# Patient Record
Sex: FEMALE | Race: BLACK OR AFRICAN AMERICAN | NOT HISPANIC OR LATINO | URBAN - METROPOLITAN AREA
[De-identification: names, ages, dates, MRNs, and addresses within clinical notes are randomized per-mention and may not be internally consistent; named-entity substitution may affect disease eponyms.]

---

## 2018-07-20 ENCOUNTER — HOSPITAL ENCOUNTER (EMERGENCY)
Facility: HOSPITAL | Age: 33
Discharge: HOME | End: 2018-07-21
Attending: EMERGENCY MEDICINE | Admitting: EMERGENCY MEDICINE
Payer: COMMERCIAL

## 2018-07-20 DIAGNOSIS — R45.1 AGITATION: Primary | ICD-10-CM

## 2018-07-20 LAB
AMPHETAMINES SERPL QL SCN: NEGATIVE
BARBITURATES UR QL: NEGATIVE
BENZODIAZ UR QL: NEGATIVE
COCAINE UR QL: NEGATIVE
ETHANOL SERPL-MCNC: 147 MG/DL (ref 0–3)
METHADONE UR QL: NEGATIVE
OPIATES UR QL SCN: NEGATIVE
PCP UR QL: NEGATIVE
THC UR QL: NEGATIVE

## 2018-07-20 PROCEDURE — 96372 THER/PROPH/DIAG INJ SC/IM: CPT

## 2018-07-20 PROCEDURE — 36415 COLL VENOUS BLD VENIPUNCTURE: CPT | Performed by: EMERGENCY MEDICINE

## 2018-07-20 PROCEDURE — 80307 DRUG TEST PRSMV CHEM ANLYZR: CPT

## 2018-07-20 PROCEDURE — 80320 DRUG SCREEN QUANTALCOHOLS: CPT | Performed by: EMERGENCY MEDICINE

## 2018-07-20 RX ORDER — HALOPERIDOL 5 MG/ML
10 INJECTION INTRAMUSCULAR ONCE
Status: COMPLETED | OUTPATIENT
Start: 2018-07-20 | End: 2018-07-20

## 2018-07-20 RX ORDER — LORAZEPAM 2 MG/ML
2 INJECTION INTRAMUSCULAR ONCE
Status: COMPLETED | OUTPATIENT
Start: 2018-07-20 | End: 2018-07-20

## 2018-07-20 RX ORDER — DIPHENHYDRAMINE HYDROCHLORIDE 50 MG/ML
50 INJECTION INTRAMUSCULAR; INTRAVENOUS ONCE
Status: COMPLETED | OUTPATIENT
Start: 2018-07-20 | End: 2018-07-20

## 2018-07-20 RX ADMIN — LORAZEPAM 2 MG: 2 INJECTION INTRAMUSCULAR; INTRAVENOUS at 21:49

## 2018-07-20 RX ADMIN — DIPHENHYDRAMINE HYDROCHLORIDE 50 MG: 50 INJECTION, SOLUTION INTRAMUSCULAR; INTRAVENOUS at 21:49

## 2018-07-20 RX ADMIN — HALOPERIDOL LACTATE 10 MG: 5 INJECTION, SOLUTION INTRAMUSCULAR at 21:48

## 2018-07-21 VITALS
TEMPERATURE: 98.7 F | OXYGEN SATURATION: 98 % | RESPIRATION RATE: 20 BRPM | SYSTOLIC BLOOD PRESSURE: 149 MMHG | DIASTOLIC BLOOD PRESSURE: 83 MMHG | HEART RATE: 74 BPM

## 2018-07-21 LAB
ETHANOL SERPL-MCNC: 111 MG/DL (ref 0–3)
ETHANOL SERPL-MCNC: <3 MG/DL (ref 0–3)
HIV 1+2 AB+HIV1 P24 AG SERPL QL IA: NORMAL
HIV1 P24 AG SER QL: NORMAL

## 2018-07-21 PROCEDURE — 80320 DRUG SCREEN QUANTALCOHOLS: CPT | Performed by: EMERGENCY MEDICINE

## 2018-07-21 PROCEDURE — 87806 HIV AG W/HIV1&2 ANTB W/OPTIC: CPT | Performed by: EMERGENCY MEDICINE

## 2018-07-21 PROCEDURE — 36415 COLL VENOUS BLD VENIPUNCTURE: CPT | Performed by: EMERGENCY MEDICINE

## 2018-07-21 PROCEDURE — 99284 EMERGENCY DEPT VISIT MOD MDM: CPT

## 2018-07-21 RX ORDER — OLANZAPINE 10 MG/1
10 TABLET, ORALLY DISINTEGRATING ORAL ONCE
Status: COMPLETED | OUTPATIENT
Start: 2018-07-21 | End: 2018-07-21

## 2018-07-21 RX ORDER — NICOTINE 21 MG/24HR
21 PATCH, TRANSDERMAL 24 HOURS TRANSDERMAL ONCE
Status: DISCONTINUED | OUTPATIENT
Start: 2018-07-21 | End: 2018-07-21 | Stop reason: HOSPADM

## 2018-07-21 RX ORDER — LORAZEPAM 1 MG/1
1 TABLET ORAL ONCE
Status: COMPLETED | OUTPATIENT
Start: 2018-07-21 | End: 2018-07-21

## 2018-07-21 RX ADMIN — NICOTINE 21 MG: 21 PATCH, EXTENDED RELEASE TRANSDERMAL at 04:58

## 2018-07-21 RX ADMIN — LORAZEPAM 1 MG: 1 TABLET ORAL at 04:47

## 2018-07-21 RX ADMIN — OLANZAPINE 10 MG: 10 TABLET, ORALLY DISINTEGRATING ORAL at 04:47

## 2018-07-21 NOTE — ED NOTES
Security and charge both confirmed it would be best for the pt's and staff safety to not obtain vitals due to pt's random outbursts and difficulty to redirect  Pt can be heard snoring and visual respirations are noted, pt clearly in no distress, appears to be sleeping        Karen Morales  07/21/18 0160

## 2018-07-21 NOTE — ED NOTES
Pt laying quietly resting in bed  Pt does not appear in distress at this time  Will continue to monitor       Elida Phoenix  07/20/18 9807

## 2018-07-21 NOTE — ED NOTES
Attempted to contact patient's cousin, Corry Saldivar at [phone], but was unsuccuessful; unable to leave a message and will attempt again shortly       MADHURI Smith  07/21/18   7905 No

## 2018-07-21 NOTE — ED NOTES
Pt unable to reach her cousin  Pt given her belongings  Pt asked for a bus voucher  Charge Nurse and Hospital Supervisor made aware  Pt is currently changing in her room  Per Charge Nurse, Bus voucher should arrive in a few minutes, and Pt can leave  Pt is anxious and continues to talk about needing to get home to take her medications and take care of her 3year old daughter  Pt made aware that a bus voucher is on the way        An Alexandre  07/21/18 8897

## 2018-07-21 NOTE — ED NOTES
Pt allowed for vitals to be taken while Emily Garcia, was at bedside  Pt breakfast tray ordered        Yossi Lantigua  07/21/18 0818

## 2018-07-21 NOTE — ED NOTES
Pt started to yell at staff "that kathya diaz gave me the wrong phone number on purpose!" Security present  Nurse administered meds to pt       Radha Mahan  07/21/18 2066

## 2018-07-21 NOTE — ED NOTES
Pt given bus voucher, cell phone, and discharge papers  Pt sent out to waiting room by Alex Burton  07/21/18 8151

## 2018-07-21 NOTE — ED NOTES
PT appears to be asleep in bed  Pt asked "When is the lady coming? I need to go home " When asked which lady, Pt stated "the lady that was Jeff come talk to me" Pt asked if she was referring to the Crisis Worker  Pt stated that she doesn't know and went back to sleep        Franky Escobar  07/21/18 1016

## 2018-07-21 NOTE — ED PROVIDER NOTES
History  Chief Complaint   Patient presents with    Psychiatric Evaluation     Pt [place] [employer] [place] [address], per Perico Lowe pt "crawled out of the woods", pt attempted to kill herself by wrapping the straps of her bag around her neck, pt extremely psychotic upon arrival - loud & aggressive  58-year-old female was brought to the emergency department by [facility] after she was found [address] along [address]  route [de-identified]  Per police report, the patient began on dressing herself as they approached  She states that she had a fight with either friends or family and left their car, running into the woods  Per 302 petition filled out by Perico Lowe, the patient made suicidal threats and wrapped the handles of her bag around her neck  The patient is a poor historian and very agitated upon my evaluation  She states I work for the [employer] but then later on 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    Patient's belongings include 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  Patient admits that she made suicidal threats to police  She denies taking any psychiatric medications  Initially she denied drug use but later admitted to so staff that she smoked spice earlier this evening  Review of systems was unobtainable secondary to patient's agitation  History provided by:  Patient   used: No    Psychiatric Evaluation   Presenting symptoms: aggressive behavior, agitation, bizarre behavior, delusional and suicidal threats    Patient accompanied by:  Law enforcement  Degree of incapacity (severity):  Severe  Onset quality:  Unable to specify  Timing:  Constant  Progression:  Unable to specify  Chronicity:  New  Context: drug abuse    Treatment compliance:  Untreated  Relieved by:  Nothing  Worsened by:  Drugs  Ineffective treatments:  None tried      None       History reviewed   No pertinent past medical history  No past surgical history on file  History reviewed  No pertinent family history  I have reviewed and agree with the history as documented  Social History   Substance Use Topics    Smoking status: Not on file    Smokeless tobacco: Not on file    Alcohol use Not on file        Review of Systems   Unable to perform ROS: Other (aggitation, ash)   Psychiatric/Behavioral: Positive for agitation  Physical Exam  Physical Exam   Constitutional: She is oriented to person, place, and time  She appears well-developed and well-nourished  HENT:   Head: Normocephalic and atraumatic  Eyes: Conjunctivae and EOM are normal  Pupils are equal, round, and reactive to light  No scleral icterus  Neck: Normal range of motion  Neck supple  No tracheal deviation present  Cardiovascular: Normal rate and regular rhythm  Pulmonary/Chest: Effort normal and breath sounds normal  No respiratory distress  Abdominal: Soft  She exhibits no distension  There is no tenderness  Musculoskeletal: Normal range of motion  She exhibits no tenderness or deformity  Lymphadenopathy:     She has no cervical adenopathy  Neurological: She is alert and oriented to person, place, and time  No gross focal sensory or motor deficits   Skin: Skin is warm and dry  Capillary refill takes less than 2 seconds  No rash noted  She is not diaphoretic  Psychiatric: She has a normal mood and affect  Pressured speech  Vitals reviewed  Vital Signs  ED Triage Vitals   Temp Pulse Resp BP SpO2   -- -- -- -- --      Temp src Heart Rate Source Patient Position - Orthostatic VS BP Location FiO2 (%)   -- -- -- -- --      Pain Score       --           There were no vitals filed for this visit      Visual Acuity      ED Medications  Medications   haloperidol lactate (HALDOL) injection 10 mg (10 mg Intramuscular Given 7/20/18 2148)   LORazepam (ATIVAN) 2 mg/mL injection 2 mg (2 mg Intramuscular Given 7/20/18 2149)   diphenhydrAMINE (BENADRYL) injection 50 mg (50 mg Intramuscular Given 7/20/18 2149)       Diagnostic Studies  Results Reviewed     Procedure Component Value Units Date/Time    Ethanol [[id number]]     Lab Status:  No result Specimen:  Blood     POCT alcohol breath test [[id number]]     Lab Status:  No result     Rapid drug screen, urine [[id number]]     Lab Status:  No result Specimen:  Urine                  No orders to display              Procedures  Procedures       Phone Contacts  ED Phone Contact    ED Course                               [facility]  CritCare Time    Disposition  Final diagnoses:   None     ED Disposition     None      MD Documentation      Most Recent Value   Sending MD  Dr Gemma Whitehead    None         Patient's Medications    No medications on file     No discharge procedures on file      ED Provider  Electronically Signed by

## 2018-07-21 NOTE — ED NOTES
Pt woke up frantically, yelling "why didn't they give me something to sleep! ?" pt was notified her cousin asked her to call when she woke up just to see if she needed to be picked up in the morning and to let her sleep, pt returned phone exclaiming "you dialed the wrong number you dialed 80!" pt was made aware that needed to be dialed to get out of network  Pt laying in bed, nurse notified pt wants [facility]        SMITH (formerly [employer]) Press  07/21/18 9264

## 2018-07-21 NOTE — ED NOTES
Attempting to gather information from pt - pt states she is an alcoholic & drank a lot of "[place] iced tea" tonight, states [place] [address] because she was getting away from a family domestic situation in [place]  Pt states she did not want to hitchhike, she laid on the road to get the police to help her  Pt became extremely angry during conversation, yelling at the ED tech, yelling that she is going to hteo the police, verbal abusive towards ED tech, slamming doors, & banging on the window         May Clancy RN  07/20/18 0692

## 2018-07-21 NOTE — ED NOTES
Blood work was draw from pt  Pt appears calm and cooperative  Will continue to monitor        Lauren A Jarred  07/21/18 0023

## 2018-07-21 NOTE — ED NOTES
302, rights and CRF e-mailed to [employer] and Manish Jeronimo at [facility]       MADHURI Espinoza  07/21/18   112

## 2018-07-21 NOTE — ED NOTES
Pt becoming extremely anxious about leaving  Pt asking repeatedly for us to call her cousin  Pt given phone to call  Provider made aware and stated that she can leave when she gets her papers        Mando Sheridan  07/21/18 7767

## 2018-07-21 NOTE — ED NOTES
Pt appears to be resting in bed  Pt appears to be calm with no signs of distress, will continue to monitor       Lauren GALINDO Jarred  07/21/18 0117

## 2018-07-21 NOTE — ED NOTES
Received report from [address]  Will continue auditory and visual monitoring on patient       Lauren MEDLEY Jarred  07/20/18 4413

## 2018-07-21 NOTE — ED NOTES
Pt family member dialed by security, pt given tech phone to speak to family member with security present       Aleyda Freeman  07/21/18 6726

## 2018-07-21 NOTE — ED NOTES
Patient was unwilling to provide any additional information at this time  She continues to scream that her  should be contacted, the police should be notified and that she works for the "[employer]  On attempts to discuss the events of tonight she explains that she was with her cousins and there was an argument so she got out of the vehicle  Patient does not have insight into the events that occurred after and denies having a psychiatric history       MADHURI Lindsay  07/20/18   5821

## 2018-07-21 NOTE — ED NOTES
Pt appears to be resting in bed  No signs of distress at this time  Will continue to monitor        Lauren MEDLEY Jarred  07/21/18 0215

## 2018-07-21 NOTE — ED NOTES
Pt  Ambulated to [facility] and attempted to wake up the patient  Pt was told to go back to her room and was redirected back to [facility]  The CW informed security about behavior  Pt is now laying in bed  Will continue to monitor        Lauren A Jarred  07/20/18 7461

## 2018-07-21 NOTE — ED NOTES
Took over observation from [facility]  Per LR, Charge Nurse and Security agreed that vitals will not be obtained due to concerns for Pt and staff safety  Pt appears to be asleep in bed  Breathing is visible and unlabored  No signs of distress noted at this time  Will continue visual and audio monitoring        Yayo Ordonez  07/21/18 2893

## 2018-07-21 NOTE — ED NOTES
Belongings List:    1 pair shorts  1pair underwear  1 bra  1 shirt  1 pair shoes  1 green camo bag  1 "[employer] housekeeping keys"  1 Scissor  1 nail clipper  1 tweezer  1 brown wallet  2 twenty dollar bills  2 ten dollar bills  1 samsung cell phone  2 cell phone chargers  1 umbrella  1 container of cocoa butter  1 bottle body fragrance  1 bottle soap  1 plastic bag of random paper     Eliza Miller  07/20/18 2661

## 2018-07-21 NOTE — ED NOTES
Pt appears to be resting in bed  No signs of distress noted  Will continue to monitor        Lauren MEDLEY Jarred  07/20/18 2952

## 2018-07-21 NOTE — ED NOTES
Met with patient and Dr Jose Maria Bernstein to re-evaluate since now sober  Patient reports that last night she was with her cousin when she got out of the car to smoke a cigarette and got out of the car when they left her  Patient denies smoking spice and confirmed she was drinking last night  Patient now denies any suicidal ideations; also denies homicidal ideations and auditory/visual hallucinations  Patient appears irritable as she did not get good sleep last night and attributes that to not having her Seroquel which she is supposed to take at night  Patient reports she feels safe to go home and does not wish to sign herself in for treatment  Patient asked that her cousin be contacted to pick her up       Jase Gauthier, [facility]  07/21/18   7458

## 2018-07-21 NOTE — ED NOTES
Pt cousin phone number [phone], call security Sil Moraes to call cousin       Pippa La  07/21/18 3904

## 2018-07-21 NOTE — ED NOTES
Several more attempts made to reach patient's cousin, however all were unsuccessful  Patient stated that she has nobody else to call and will walk home if she needs to  Patient is very anxious and states that she hates being in the hospital  Charge nurse was asked about a bus ticket for patient to assist in getting her closer to home  Patient made aware that we are waiting for her bus ticket so she can be discharged      MADHURI Espinoza  07/21/18   9911

## 2018-07-21 NOTE — ED NOTES
Pt became argumentative and began yelling and cursing at ED tech  Pt refused to go back to room  Security was notified  Pt then requested to use her phone  Security then provided the pt with the tech cellphone  Pt left message to whomever she was calling  Pt then requested food and ginger ale and was provided such  Pt also requested medication to help her sleep and was given the [address] tv remote to calm her down        Lauren A Jarred  07/21/18 0314

## 2018-08-21 NOTE — ED PROVIDER NOTES
History  Chief Complaint   Patient presents with    Psychiatric Evaluation     Pt [place] [employer] [place] [address], per Perico Lowe pt "crawled out of the woods", pt attempted to kill herself by wrapping the straps of her bag around her neck, pt extremely psychotic upon arrival - loud & aggressive  History provided by:  Patient and police  History limited by: intox   used: No    Psychiatric Evaluation   Presenting symptoms: agitation and suicidal threats    Patient accompanied by:  Law enforcement  Degree of incapacity (severity):  Severe  Onset quality:  Unable to specify  Timing:  Constant  Progression:  Unable to specify  Context: alcohol use and drug abuse    Treatment compliance:  Unable to specify  Relieved by:  Nothing  Worsened by:  Drugs and family interactions  Ineffective treatments:  None tried      33 yo F was brought to the emergency room by [facility] after she was found "dancing" [place] [address]  Per police report, the pt began undressing herself as police approached  Pt states she had a fight with either friends of family and left their car, running into the woods  Per 302 petition filled out by Perico Lowe, the pt made suicidal threats and wrapped the handles of her bag around her neck  The pt is a poor historian and very agitated upon my eval  She states "I work for the [employer]" but 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  Pt admits that she made suicidal threats to police, denies psych history or meds  Initially denied drug use but later admitted to alcohol and "smoking spice " ROS unobtainable due to intox and agitation  None       History reviewed  No pertinent past medical history  No past surgical history on file  History reviewed  No pertinent family history    I have reviewed and agree with the history as documented  Social History   Substance Use Topics    Smoking status: Not on file    Smokeless tobacco: Not on file    Alcohol use Not on file        Review of Systems   Unable to perform ROS: Other (intox, psych)   Psychiatric/Behavioral: Positive for agitation  Physical Exam  Physical Exam   Constitutional: She appears well-developed and well-nourished  Agitated, pressured speech   HENT:   Head: Normocephalic and atraumatic  Eyes: Conjunctivae and EOM are normal  No scleral icterus  Neck: Normal range of motion  Neck supple  Cardiovascular: Normal rate and regular rhythm  Pulmonary/Chest: Effort normal and breath sounds normal  No respiratory distress  Abdominal: Soft  She exhibits no distension  There is no tenderness  Musculoskeletal: Normal range of motion  She exhibits no deformity  Neurological: She is alert  Agitated  No gross focal sensory or motor deficits   Skin: Skin is warm and dry  Capillary refill takes less than 2 seconds  No rash noted  Psychiatric:   agitated   Vitals reviewed        Vital Signs  ED Triage Vitals   Temperature Pulse Respirations Blood Pressure SpO2   07/21/18 1126 07/21/18 0047 07/21/18 0047 07/21/18 0047 07/21/18 0047   98 7 °F (37 1 °C) 96 18 119/56 100 %      Temp Source Heart Rate Source Patient Position - Orthostatic VS BP Location FiO2 (%)   07/21/18 1126 07/21/18 0047 07/21/18 0047 07/21/18 0047 --   Oral Monitor Lying Left arm       Pain Score       07/21/18 0047       No Pain           Vitals:    07/21/18 0047 07/21/18 0817 07/21/18 1126   BP: 119/56 126/74 149/83   Pulse: 96 87 74   Patient Position - Orthostatic VS: Lying Sitting Sitting       Visual Acuity      ED Medications  Medications   haloperidol lactate (HALDOL) injection 10 mg (10 mg Intramuscular Given 7/20/18 2148)   LORazepam (ATIVAN) 2 mg/mL injection 2 mg (2 mg Intramuscular Given 7/20/18 2149)   diphenhydrAMINE (BENADRYL) injection 50 mg (50 mg Intramuscular Given 7/20/18 2149)   LORazepam (ATIVAN) tablet 1 mg (1 mg Oral Given 7/21/18 0447)   OLANZapine (ZyPREXA ZYDIS) dispersible tablet 10 mg (10 mg Oral Given 7/21/18 0447)       Diagnostic Studies  Results Reviewed     Procedure Component Value Units Date/Time    Rapid HIV 1/2 AB-AG Combo [[id number]]  (Normal) Collected:  07/21/18 1100    Lab Status:  Final result Specimen:  Blood from Arm, Left Updated:  07/21/18 1139     Rapid HIV 1 AND 2 Non-Reactive     HIV-1 P24 Ag Screen Non-Reactive    Narrative:         Negative for HIV-1 p24 Antigen  Negative for HIV-1 and/or HIV-2 Antibody  Ethanol [[id number]]  (Normal) Collected:  07/21/18 0936    Lab Status:  Final result Specimen:  Blood from Arm, Right Updated:  07/21/18 1027     Ethanol Lvl <3 mg/dL     Ethanol [[id number]]  (Abnormal) Collected:  07/21/18 0022    Lab Status:  Final result Specimen:  Blood from Arm, Left Updated:  07/21/18 0041     Ethanol Lvl 111 (H) mg/dL     Ethanol [[id number]]  (Abnormal) Collected:  07/20/18 2239    Lab Status:  Final result Specimen:  Blood from Arm, Right Updated:  07/20/18 2259     Ethanol Lvl 147 (H) mg/dL     Rapid drug screen, urine [[id number]]  (Normal) Collected:  07/20/18 2236    Lab Status:  Final result Specimen:  Urine from Urine, Clean Catch Updated:  07/20/18 2255     Amph/Meth UR Negative     Barbiturate Ur Negative     Benzodiazepine Urine Negative     Cocaine Urine Negative     Methadone Urine Negative     Opiate Urine Negative     PCP Ur Negative     THC Urine Negative    Narrative:         FOR MEDICAL PURPOSES ONLY  IF CONFIRMATION NEEDED PLEASE CONTACT THE LAB WITHIN 5 DAYS      Drug Screen Cutoff Levels:  AMPHETAMINE/METHAMPHETAMINES  1000 ng/mL  BARBITURATES     200 ng/mL  BENZODIAZEPINES     200 ng/mL  COCAINE      300 ng/mL  METHADONE      300 ng/mL  OPIATES      300 ng/mL  PHENCYCLIDINE     25 ng/mL  THC       50 ng/mL                 No orders to display              Procedures  Procedures Phone Contacts  ED Phone Contact    ED Course                               MDM  Number of Diagnoses or Management Options  Agitation: new and requires workup  Diagnosis management comments: Agitated and intoxicated  302 petitioned by [facility], will need crisis eval when sober  Amount and/or Complexity of Data Reviewed  Clinical lab tests: reviewed and ordered  Review and summarize past medical records: yes  Discuss the patient with other providers: yes      CritCare Time    Disposition  Final diagnoses:   Agitation     Time reflects when diagnosis was documented in both MDM as applicable and the Disposition within this note     Time User Action Codes Description Comment    8/21/2018  5:44 PM [employer] Add [R45 1] Agitation       ED Disposition     ED Disposition Condition Comment    Discharge  Rickie Brar discharge to home/self care  Condition at discharge: Good        MD Documentation      Most Recent Value   Sending MD Dr Radha Coughlin    None         There are no discharge medications for this patient  No discharge procedures on file      ED Provider  Electronically Signed by           Jeannette Snell MD  08/21/18 4175